# Patient Record
Sex: FEMALE | Race: WHITE | NOT HISPANIC OR LATINO
[De-identification: names, ages, dates, MRNs, and addresses within clinical notes are randomized per-mention and may not be internally consistent; named-entity substitution may affect disease eponyms.]

---

## 2017-08-04 ENCOUNTER — TRANSCRIPTION ENCOUNTER (OUTPATIENT)
Age: 22
End: 2017-08-04

## 2018-03-23 ENCOUNTER — TRANSCRIPTION ENCOUNTER (OUTPATIENT)
Age: 23
End: 2018-03-23

## 2020-11-18 ENCOUNTER — TRANSCRIPTION ENCOUNTER (OUTPATIENT)
Age: 25
End: 2020-11-18

## 2020-12-06 ENCOUNTER — TRANSCRIPTION ENCOUNTER (OUTPATIENT)
Age: 25
End: 2020-12-06

## 2020-12-21 ENCOUNTER — TRANSCRIPTION ENCOUNTER (OUTPATIENT)
Age: 25
End: 2020-12-21

## 2020-12-30 ENCOUNTER — TRANSCRIPTION ENCOUNTER (OUTPATIENT)
Age: 25
End: 2020-12-30

## 2021-02-10 ENCOUNTER — TRANSCRIPTION ENCOUNTER (OUTPATIENT)
Age: 26
End: 2021-02-10

## 2021-03-15 ENCOUNTER — TRANSCRIPTION ENCOUNTER (OUTPATIENT)
Age: 26
End: 2021-03-15

## 2021-04-13 ENCOUNTER — TRANSCRIPTION ENCOUNTER (OUTPATIENT)
Age: 26
End: 2021-04-13

## 2021-08-09 ENCOUNTER — APPOINTMENT (OUTPATIENT)
Dept: OTOLARYNGOLOGY | Facility: CLINIC | Age: 26
End: 2021-08-09
Payer: COMMERCIAL

## 2021-08-09 ENCOUNTER — LABORATORY RESULT (OUTPATIENT)
Age: 26
End: 2021-08-09

## 2021-08-09 VITALS
HEIGHT: 67 IN | TEMPERATURE: 97.9 F | BODY MASS INDEX: 32.49 KG/M2 | WEIGHT: 207 LBS | HEART RATE: 79 BPM | SYSTOLIC BLOOD PRESSURE: 119 MMHG | DIASTOLIC BLOOD PRESSURE: 78 MMHG | OXYGEN SATURATION: 96 %

## 2021-08-09 DIAGNOSIS — Z81.3 FAMILY HISTORY OF OTHER PSYCHOACTIVE SUBSTANCE ABUSE AND DEPENDENCE: ICD-10-CM

## 2021-08-09 DIAGNOSIS — Z78.9 OTHER SPECIFIED HEALTH STATUS: ICD-10-CM

## 2021-08-09 DIAGNOSIS — Z86.59 PERSONAL HISTORY OF OTHER MENTAL AND BEHAVIORAL DISORDERS: ICD-10-CM

## 2021-08-09 DIAGNOSIS — Z87.891 PERSONAL HISTORY OF NICOTINE DEPENDENCE: ICD-10-CM

## 2021-08-09 DIAGNOSIS — Z83.438 FAMILY HISTORY OF OTHER DISORDER OF LIPOPROTEIN METABOLISM AND OTHER LIPIDEMIA: ICD-10-CM

## 2021-08-09 PROBLEM — Z00.00 ENCOUNTER FOR PREVENTIVE HEALTH EXAMINATION: Status: ACTIVE | Noted: 2021-08-09

## 2021-08-09 PROCEDURE — 31575 DIAGNOSTIC LARYNGOSCOPY: CPT

## 2021-08-09 PROCEDURE — 99204 OFFICE O/P NEW MOD 45 MIN: CPT | Mod: 25

## 2021-08-09 NOTE — PHYSICAL EXAM
[Midline] : trachea located in midline position [Laryngoscopy Performed] : laryngoscopy was performed, see procedure section for findings [Normal] : lingual tonsils are normal [de-identified] : +2 on the right and +3 on the left., soft on palpation

## 2021-08-09 NOTE — ASSESSMENT
[FreeTextEntry1] : 25 year old female with low grade and recurrent throat discomfort x 3 months. On exam, she has tonsillar hypertrophy, and Left slightly larger than Right, without discharge or tonsilliths.  She recently had history of strep infection x 1 treated with clindamycin and oral steroids without benefit.  \par \par Today culture and sensitivity was sent.  In the interim, I am recommend rest, hydration, acetaminophen or ibuprofen for pain or discomfort.  We will also start 5 day course of azithromycin (PCN allergic and failed clinda recently), and CT neck with contrast due to asymmetry.  \par \par We will followup with throat culture results and direct abx therapy as such.  Advised her that she does not meet the criteria for tonsillectomy.  If her symptoms do not improve, consider Infectious Disease consult.\par \par - rest, hydration, motrin\par - throat culture, will followup \par - zpak\par - CT neck w Contrast for tonsil asymmetry

## 2021-08-09 NOTE — CONSULT LETTER
[Dear  ___] : Dear  [unfilled], [Consult Letter:] : I had the pleasure of evaluating your patient, [unfilled]. [Please see my note below.] : Please see my note below. [Consult Closing:] : Thank you very much for allowing me to participate in the care of this patient.  If you have any questions, please do not hesitate to contact me. [Sincerely,] : Sincerely, [FreeTextEntry3] : Ion Victoria MD\par Director; [facility]\par Otolaryngology - Head and Neck Surgery\par [facility]\par \par \par Department of Otolaryngology\par [facility]\par \par [address]\par [facility], 10th Floor\[place], [place], [address]\par Office Tel: [phone]\par \par \par

## 2021-08-09 NOTE — PROCEDURE
[de-identified] : Laryngoscopy: Flexible Laryngoscopy - Procedure Note\par \par Pre-operative Diagnosis: tonsil hypertrophy\par Post-operative Diagnosis: tonsil hypertrophy\par Anesthesia: Topical - 1 % Lidocaine/Phenylephrine\par Procedure: Flexible Laryngoscopy\par \par Procedure Details: \par The patient was placed in the sitting position. After decongestant and anesthesia were applied the laryngoscope was passed. The nasal cavities, nasopharynx, oropharynx, hypopharynx, and larynx were all examined. Vocal folds were examined during respiration and phonation. The following findings were noted:\par \par Findings: \par Nose: Septum is midline, turbinates are normal, nasal airways patent, mucosa normal\par Nasopharynx: Adenoids normal, no masses, eustachian tube normal\par Oropharynx: Pharyngeal walls symmetric and without lesion. [place] tonsils 3+, left > right, cryptic, without discharge or tonsilliths. Mild, symmetric lingual tonsil hypertrophy.\par Hypopharynx: Hypopharynx and pyriform sinuses without lesion. No masses or asymmetry. No pooling of secretions.\par Larynx: Epiglottis and aryepiglottic folds were sharp and crisp bilaterally. Bilateral false and true vocal folds normal appearance. Bilateral vocal folds fully mobile and symmetric. Airway was widely patent. \par \par Condition: Stable. Patient tolerated procedure well.\par \par Complications: None\par \par

## 2021-08-09 NOTE — HISTORY OF PRESENT ILLNESS
[de-identified] : 26 yo female c/o persistently enlarged left tonsil, with associated nausea, pain at the end of the day x 3 months.  Denies chills, cough, dysphonia, dysphagia, odynophagia, dyspnea, or recurrent tonsillitis. Reports sore throat at end of day of talking, night sweats.  No fevers, weight loss, referred otalgia.  Non smoker, non drinker.  She works as a [employer], talks to clients all day.\par \par She was referred by Dr. Raines, tested positive for strep, treated with clindamycin and oral steroids 2 weeks ago without relief. Upset stomach, flu-like symptoms with the medications.\par \par No other ENT complaints.\par \par Diet:\par + 3 cups coffee, occasional tea, sometimes spicy/tomato/greasy foods, seltzer, mint, chocolate, social ETOH.\par 2 L of water daily

## 2021-08-14 ENCOUNTER — OUTPATIENT (OUTPATIENT)
Dept: OUTPATIENT SERVICES | Facility: HOSPITAL | Age: 26
LOS: 1 days | End: 2021-08-14
Payer: COMMERCIAL

## 2021-08-14 ENCOUNTER — RESULT REVIEW (OUTPATIENT)
Age: 26
End: 2021-08-14

## 2021-08-14 ENCOUNTER — APPOINTMENT (OUTPATIENT)
Dept: CT IMAGING | Facility: HOSPITAL | Age: 26
End: 2021-08-14

## 2021-08-14 PROCEDURE — 70491 CT SOFT TISSUE NECK W/DYE: CPT | Mod: 26

## 2021-08-14 PROCEDURE — 70491 CT SOFT TISSUE NECK W/DYE: CPT

## 2021-08-16 ENCOUNTER — TRANSCRIPTION ENCOUNTER (OUTPATIENT)
Age: 26
End: 2021-08-16

## 2021-08-16 ENCOUNTER — APPOINTMENT (OUTPATIENT)
Dept: OTOLARYNGOLOGY | Facility: CLINIC | Age: 26
End: 2021-08-16

## 2021-08-17 ENCOUNTER — APPOINTMENT (OUTPATIENT)
Dept: OTOLARYNGOLOGY | Facility: CLINIC | Age: 26
End: 2021-08-17
Payer: COMMERCIAL

## 2021-08-17 PROCEDURE — 99213 OFFICE O/P EST LOW 20 MIN: CPT | Mod: 95

## 2021-08-17 NOTE — ASSESSMENT
[FreeTextEntry1] : 25 year old female with low grade and recurrent throat discomfort x 3 months. On exam, she has tonsillar hypertrophy, and Left slightly larger than Right, without discharge or tonsilliths.  She recently had history of strep infection x 1 treated with clindamycin and oral steroids without benefit.  And now azithromycin without improvement.  CT neck essentially negative.\par \par At the last visit, Throat culture and sensitivity were sent however the lab changed the order and only did a throat culture showing GBS without sensitivities.\par \par At this point I would like her to return to clinic or repeat culture and sensitivities If her symptoms do not improve, consider Infectious Disease consult.\par \par - rest, hydration, Motrin\par - repeat throat culture\par - consider ID consultation, currently with GBS however PCN allergic without improvement on clinda or azithromycin\par - consider dietary and behavioral modification to reduce acid reflux in diet.

## 2021-08-17 NOTE — HISTORY OF PRESENT ILLNESS
[de-identified] : 26 yo female c/o persistently enlarged left tonsil, with associated nausea, pain at the end of the day x 3 months.  Denies chills, cough, dysphonia, dysphagia, odynophagia, dyspnea, or recurrent tonsillitis. Reports sore throat at end of day of talking, night sweats.  No fevers, weight loss, referred otalgia.  Non smoker, non drinker.  She works as a [employer], talks to clients all day.\par \par She was referred by Dr. Raines, tested positive for strep, treated with clindamycin and oral steroids 2 weeks ago without relief. Upset stomach, flu-like symptoms with the medications.\par \par No other ENT complaints.\par \par Diet:\par + 3 cups coffee, occasional tea, sometimes spicy/tomato/greasy foods, seltzer, mint, chocolate, social ETOH.\par 2 L of water daily\par - [FreeTextEntry1] : Update 8/17/21\par Overall stable and well.  She did take the z-kuldeep, but noted no improvement.  She also completed the CT neck and presents to review results as well as of the culture.  No new ENT issues at this time.

## 2021-08-30 ENCOUNTER — LABORATORY RESULT (OUTPATIENT)
Age: 26
End: 2021-08-30

## 2021-08-30 ENCOUNTER — APPOINTMENT (OUTPATIENT)
Dept: OTOLARYNGOLOGY | Facility: CLINIC | Age: 26
End: 2021-08-30
Payer: COMMERCIAL

## 2021-08-30 VITALS
WEIGHT: 205 LBS | HEART RATE: 76 BPM | SYSTOLIC BLOOD PRESSURE: 112 MMHG | TEMPERATURE: 98.2 F | BODY MASS INDEX: 32.95 KG/M2 | HEIGHT: 66 IN | DIASTOLIC BLOOD PRESSURE: 74 MMHG | OXYGEN SATURATION: 97 %

## 2021-08-30 PROCEDURE — 99214 OFFICE O/P EST MOD 30 MIN: CPT

## 2021-08-30 NOTE — PHYSICAL EXAM
[Midline] : trachea located in midline position [Normal] : palatine tonsils are normal [de-identified] : +2 on the right and +3 on the left., soft on palpation, +exudate on the tonsil worse on the right

## 2021-08-30 NOTE — HISTORY OF PRESENT ILLNESS
[de-identified] : 24 yo female c/o persistently enlarged left tonsil, with associated nausea, pain at the end of the day x 3 months. Denies chills, cough, dysphonia, dysphagia, odynophagia, dyspnea, or recurrent tonsillitis. Reports sore throat at end of day of talking, night sweats. No fevers, weight loss, referred otalgia. Non smoker, non drinker. She works as a [employer], talks to clients all day.\par \par She was referred by Dr. Raines, tested positive for strep, treated with clindamycin and oral steroids 2 weeks ago without relief. Upset stomach, flu-like symptoms with the medications.\par \par No other ENT complaints.\par \par Diet:\par + 3 cups coffee, occasional tea, sometimes spicy/tomato/greasy foods, seltzer, mint, chocolate, social ETOH.\par 2 L of water daily\par - \par Update 8/17/21\par Overall stable and well. She did take the z-kuldeep, but noted no improvement. She also completed the CT neck and presents to review results as well as of the culture. No new ENT issues at this time. \par - \par  [FreeTextEntry1] : Update 8/30/21\par Overall stable and well.  She reports her sore throat has worsened and now has persistent pain.  Seen by PCP and noted to have an exudate.  Denies dysphagia, dyspnea, or dysphonia.

## 2021-08-30 NOTE — ASSESSMENT
[FreeTextEntry1] : 25 year old female with low grade and recurrent throat discomfort x 3 months. On exam, she has tonsillar hypertrophy, and Left slightly larger than Right, today with exudates. She recently had history of strep infection x 1 treated with clindamycin and oral steroids without benefit. And now azithromycin without improvement. CT neck essentially negative.\par \par At the last visit, Throat culture and sensitivity were sent however the lab changed the order and only did a throat culture showing GBS without sensitivities.\par \par Today I repeated the throat culture and sensitivities.  In the meantime, I recommend a 10 day course of cefdinir.  She reported a thigh rash with amoxicillin, denies any anaphylaxis or angioedema.  We reviewed this extensively in the literally and she knows to take Benadryl and go to ED if there are any signs of allergic reaction which were reviewed.  I also recommend that she see an Infectious Disease specialist, Dr. Vanessa Padilla for further evaluation.\par \par Ultimately she will need to have a tonsillectomy, but not until 3 mo after she has recovered from this infection.\par \par - cefdinir 300mg bid x 10 days\par - repeat throat culture sent\par - Infectious Disease consult\par - f/u end of this week to review cultures and check progress on abx.

## 2021-09-03 ENCOUNTER — APPOINTMENT (OUTPATIENT)
Dept: OTOLARYNGOLOGY | Facility: CLINIC | Age: 26
End: 2021-09-03
Payer: COMMERCIAL

## 2021-09-03 VITALS
SYSTOLIC BLOOD PRESSURE: 115 MMHG | OXYGEN SATURATION: 97 % | HEART RATE: 73 BPM | DIASTOLIC BLOOD PRESSURE: 78 MMHG | HEIGHT: 66 IN | TEMPERATURE: 98 F | WEIGHT: 205 LBS | BODY MASS INDEX: 32.95 KG/M2

## 2021-09-03 PROCEDURE — 99213 OFFICE O/P EST LOW 20 MIN: CPT

## 2021-09-03 RX ORDER — AZITHROMYCIN 250 MG/1
250 TABLET, FILM COATED ORAL
Qty: 1 | Refills: 0 | Status: DISCONTINUED | COMMUNITY
Start: 2021-08-09 | End: 2021-09-03

## 2021-09-03 RX ORDER — SERTRALINE HYDROCHLORIDE 50 MG/1
50 TABLET, FILM COATED ORAL
Refills: 0 | Status: ACTIVE | COMMUNITY

## 2021-09-03 NOTE — ASSESSMENT
[FreeTextEntry1] : 25 year old female with low grade and recurrent throat discomfort x 3 months. On exam, she has tonsillar hypertrophy, and Left slightly larger than Right, today with exudates. She recently had history of strep infection x 1 treated with clindamycin and oral steroids without benefit. And now azithromycin without improvement. CT neck essentially negative.  At the last visit, Throat culture and sensitivity were sent however the lab changed the order and only did a throat culture showing GBS without sensitivities.\par \par I repeated the throat culture on 8/30 but the lab was unable to process the sample. In the meantime, she reports 20-25% improvement on cefdinir. She is awaiting to hear back from Infectious Disease specialist, Dr. Vanessa Padilla for an appointment for further evaluation.  I repeated the throat culture today.  She will complete the 10 day course of cefdinir and return in 1-2 weeks for re-evaluation.\par \par Ultimately she will need to have a tonsillectomy, but not until 3 mo after she has recovered from this infection.\par \par - complete cefdinir 300mg bid\par - throat culture\par - f/u 1- 2 weeks\par - Infectious Disease consult\par

## 2021-09-03 NOTE — HISTORY OF PRESENT ILLNESS
[de-identified] : 26 yo female c/o persistently enlarged left tonsil, with associated nausea, pain at the end of the day x 3 months. Denies chills, cough, dysphonia, dysphagia, odynophagia, dyspnea, or recurrent tonsillitis. Reports sore throat at end of day of talking, night sweats. No fevers, weight loss, referred otalgia. Non smoker, non drinker. She works as a [employer], talks to clients all day.\par \par She was referred by Dr. Raines, tested positive for strep, treated with clindamycin and oral steroids 2 weeks ago without relief. Upset stomach, flu-like symptoms with the medications.\par \par No other ENT complaints.\par \par Diet:\par + 3 cups coffee, occasional tea, sometimes spicy/tomato/greasy foods, seltzer, mint, chocolate, social ETOH.\par 2 L of water daily\par - \par Update 8/17/21\par Overall stable and well. She did take the z-kuldeep, but noted no improvement. She also completed the CT neck and presents to review results as well as of the culture. No new ENT issues at this time. \par - \par Update 8/30/21\par Overall stable and well. She reports her sore throat has worsened and now has persistent pain. Seen by PCP and noted to have an exudate. Denies dysphagia, dyspnea, or dysphonia. \par - [FreeTextEntry1] : Update 9/3/21\par Overall stable and well.  She has been taking Cefdinir since Tuesday, denies adverse reaction to medication. Sore throat decreased from 8-9/10 to 6/10 and is now off and on instead of constant.  A throat culture was taken on 8/30/21 but the lab was unable to process the sample.

## 2021-09-03 NOTE — PHYSICAL EXAM
[Normal] : orientation to person, place, and time: normal [de-identified] : +2 on the right and +3 on the left., soft on palpation, +exudate on the tonsil worse on the right. \par

## 2021-09-09 LAB — EAR NOSE AND THROAT CULTURE: ABNORMAL

## 2021-09-20 ENCOUNTER — APPOINTMENT (OUTPATIENT)
Dept: INFECTIOUS DISEASE | Facility: CLINIC | Age: 26
End: 2021-09-20

## 2021-09-20 ENCOUNTER — APPOINTMENT (OUTPATIENT)
Dept: OTOLARYNGOLOGY | Facility: CLINIC | Age: 26
End: 2021-09-20
Payer: COMMERCIAL

## 2021-09-20 VITALS — TEMPERATURE: 98 F | SYSTOLIC BLOOD PRESSURE: 112 MMHG | HEART RATE: 73 BPM | DIASTOLIC BLOOD PRESSURE: 75 MMHG

## 2021-09-20 PROCEDURE — 99213 OFFICE O/P EST LOW 20 MIN: CPT

## 2021-09-20 NOTE — HISTORY OF PRESENT ILLNESS
[de-identified] : 24 yo female c/o persistently enlarged left tonsil, with associated nausea, pain at the end of the day x 3 months. Denies chills, cough, dysphonia, dysphagia, odynophagia, dyspnea, or recurrent tonsillitis. Reports sore throat at end of day of talking, night sweats. No fevers, weight loss, referred otalgia. Non smoker, non drinker. She works as a [employer], talks to clients all day.\par \par She was referred by Dr. Raines, tested positive for strep, treated with clindamycin and oral steroids 2 weeks ago without relief. Upset stomach, flu-like symptoms with the medications.\par \par No other ENT complaints.\par \par Diet:\par + 3 cups coffee, occasional tea, sometimes spicy/tomato/greasy foods, seltzer, mint, chocolate, social ETOH.\par 2 L of water daily\par - \par Update 8/17/21\par Overall stable and well. She did take the z-kuldeep, but noted no improvement. She also completed the CT neck and presents to review results as well as of the culture. No new ENT issues at this time. \par - \par Update 8/30/21\par Overall stable and well.  She reports her sore throat has worsened and now has persistent pain.  Seen by PCP and noted to have an exudate.  Denies dysphagia, dyspnea, or dysphonia.\par - [FreeTextEntry1] : Update 9/20/21\par Pt is doing much better. Essentially back to her baseline and asymptomatic.  No new ENT issues

## 2021-09-20 NOTE — ASSESSMENT
[FreeTextEntry1] : 25 year old female with low grade and recurrent throat discomfort.  Symptoms have now abated after using cefdinir.  She is essentially back to her baseline.  At this point I would wait at least 3 months and than plan for adenotonsillectomy.  Risk, benefits and alternatives of adenotonsillectomy were discussed including bleeding, infection, pain, risk of anesthesia, problems chewing or swallowing, referred ear pain, need for further procedures and possible time off of work.  Should would like to proceed.  She knows that if she has another infection will with post this date.\par \par In the interim, i would recommend consultation with ID for their thoughts on treating this infection, or should symptoms recur.  Likewise, I think she would benefit from testing to see if PCN allergy is real.  Followup next for pre op, or sooner should symptoms return.\par \par \par - plan for adenotonsillectomy\par - fu if symptoms recur\par - Infectious Disease consult\par - PCN allergy testing

## 2021-09-20 NOTE — PHYSICAL EXAM
[Midline] : trachea located in midline position [Normal] : orientation to person, place, and time: normal [de-identified] : +2 bilaterally with some tonsillith but no exudate.

## 2021-10-11 ENCOUNTER — APPOINTMENT (OUTPATIENT)
Dept: INFECTIOUS DISEASE | Facility: CLINIC | Age: 26
End: 2021-10-11
Payer: COMMERCIAL

## 2021-10-11 VITALS
HEART RATE: 81 BPM | WEIGHT: 207 LBS | HEIGHT: 66 IN | DIASTOLIC BLOOD PRESSURE: 73 MMHG | OXYGEN SATURATION: 97 % | TEMPERATURE: 97.6 F | SYSTOLIC BLOOD PRESSURE: 105 MMHG | BODY MASS INDEX: 33.27 KG/M2

## 2021-10-11 PROCEDURE — 99203 OFFICE O/P NEW LOW 30 MIN: CPT

## 2021-10-11 RX ORDER — CEFDINIR 300 MG/1
300 CAPSULE ORAL
Qty: 20 | Refills: 0 | Status: COMPLETED | COMMUNITY
Start: 2021-08-30 | End: 2021-10-11

## 2021-10-11 RX ORDER — ERGOCALCIFEROL 1.25 MG/1
1.25 MG CAPSULE, LIQUID FILLED ORAL
Qty: 4 | Refills: 0 | Status: ACTIVE | COMMUNITY
Start: 2021-03-25

## 2021-10-11 RX ORDER — PREDNISONE 5 MG/1
5 TABLET ORAL
Qty: 20 | Refills: 0 | Status: DISCONTINUED | COMMUNITY
Start: 2021-07-22

## 2021-10-11 RX ORDER — CLINDAMYCIN HYDROCHLORIDE 150 MG/1
150 CAPSULE ORAL
Qty: 40 | Refills: 0 | Status: DISCONTINUED | COMMUNITY
Start: 2021-07-16

## 2021-10-11 NOTE — HISTORY OF PRESENT ILLNESS
[FreeTextEntry1] : 26 yo female presenting for ID evaluation of pharyngitis. She reports episode of streptococcal pharyngitis in childhood. She subsequently developed L tonsillar enlargement/tonsillitis since ~4/2021. She has hx amoxicillin allergy (hives as a child) and has not been re-exposed to penicillins. She was treated with clindamycin (reported adverse effects), azithromycin (no improvement), and most recently a course of cefdinir in early Sept 2021 7-10d with interval resolution of tonsillitis. She continues to note L tonsillar enlargement and occasional halitosis but no recurrence of throat pain or exudate. She denies fever. Does note intermittent night sweats/diaphoresis (preceded onset of throat symptoms) ?related to SSRI. Pending L palatine tonsillectomy 1/2022.

## 2021-10-11 NOTE — ASSESSMENT
[FreeTextEntry1] : 24 yo female presenting for ID evaluation of pharyngitis. No current evidence of tonsillitis/pharyngitis. Pending L palatine tonsillectomy 1/2022.\par - referral to allergy for PCN skin testing provided\par - pending allergist assessment, if develops recurrent tonsillitis could consider empiric treatment with cefdinir again given good response; alternative in PCN allergic patient could be levofloxacin plus metronidazole (although may be less well-tolerated); would avoid future clindamycin use given high rates of resistance in GBS and oral anaerobes. \par - if PCN skin testing negative, then PO amoxicillin would be a future option for treatment.\par \par rtc prn

## 2021-10-11 NOTE — PHYSICAL EXAM
[General Appearance - Alert] : alert [General Appearance - In No Acute Distress] : in no acute distress [FreeTextEntry1] : L palatine tonsil enlargement; tonsillith; no exudates [Skin Color & Pigmentation] : normal skin color and pigmentation [] : no rash [Deep Tendon Reflexes (DTR)] : deep tendon reflexes were 2+ and symmetric [Sensation] : the sensory exam was normal to light touch and pinprick [No Focal Deficits] : no focal deficits [Oriented To Time, Place, And Person] : oriented to person, place, and time [Affect] : the affect was normal

## 2021-11-20 ENCOUNTER — TRANSCRIPTION ENCOUNTER (OUTPATIENT)
Age: 26
End: 2021-11-20

## 2021-12-20 ENCOUNTER — APPOINTMENT (OUTPATIENT)
Dept: OTOLARYNGOLOGY | Facility: CLINIC | Age: 26
End: 2021-12-20
Payer: COMMERCIAL

## 2021-12-20 VITALS
TEMPERATURE: 98.4 F | BODY MASS INDEX: 33.75 KG/M2 | DIASTOLIC BLOOD PRESSURE: 79 MMHG | HEIGHT: 66 IN | WEIGHT: 210 LBS | OXYGEN SATURATION: 97 % | SYSTOLIC BLOOD PRESSURE: 120 MMHG | HEART RATE: 79 BPM

## 2021-12-20 DIAGNOSIS — Z88.0 ALLERGY STATUS TO PENICILLIN: ICD-10-CM

## 2021-12-20 PROCEDURE — 99214 OFFICE O/P EST MOD 30 MIN: CPT

## 2021-12-20 NOTE — PHYSICAL EXAM
[Midline] : trachea located in midline position [Normal] : orientation to person, place, and time: normal [de-identified] : +2 bilaterally with some tonsillith but no exudate.

## 2021-12-20 NOTE — HISTORY OF PRESENT ILLNESS
[de-identified] : 24 yo female c/o persistently enlarged left tonsil, with associated nausea, pain at the end of the day x 3 months. Denies chills, cough, dysphonia, dysphagia, odynophagia, dyspnea, or recurrent tonsillitis. Reports sore throat at end of day of talking, night sweats. No fevers, weight loss, referred otalgia. Non smoker, non drinker. She works as a [employer], talks to clients all day.\par \par She was referred by Dr. Raines, tested positive for strep, treated with clindamycin and oral steroids 2 weeks ago without relief. Upset stomach, flu-like symptoms with the medications.\par \par No other ENT complaints.\par \par Diet:\par + 3 cups coffee, occasional tea, sometimes spicy/tomato/greasy foods, seltzer, mint, chocolate, social ETOH.\par 2 L of water daily\par - \par Update 8/17/21\par Overall stable and well. She did take the z-kuldeep, but noted no improvement. She also completed the CT neck and presents to review results as well as of the culture. No new ENT issues at this time. \par - \par Update 8/30/21\par Overall stable and well.  She reports her sore throat has worsened and now has persistent pain.  Seen by PCP and noted to have an exudate.  Denies dysphagia, dyspnea, or dysphonia.\par -\par Update 9/20/21\par Pt is doing much better. Essentially back to her baseline and asymptomatic.  No new ENT issues\par - [FreeTextEntry1] : Update 12/20/21\par Pt is overall stable and well.  No change in symptoms.  No new ENT symptoms.  Seen by ID and recommending c/s with allergy to test PCN allergy.  Also recommended continued use with cephalosporins as needed.

## 2021-12-20 NOTE — ASSESSMENT
[FreeTextEntry1] : 26 year old female with low grade and recurrent throat discomfort.  Previous tonsillitis abated after using cefdinir.  She is back to her baseline and has waited at least 3 months for adenotonsillectomy.  Surgery is planned for next month.  Risk, benefits and alternatives of adenotonsillectomy were again discussed including bleeding, infection, pain, risk of anesthesia, problems chewing or swallowing, referred ear pain, need for further procedures and possible time off of work.  Should would like to proceed.  She knows that if she has another infection will with postpone this date.\par \par Of note consultation with ID rec ok use of cephalosporins if needed and rec c/s with allergy to see if PCN allergy is real.\par \par \par - plan for adenotonsillectomy\par - PCN allergy testing

## 2022-01-03 ENCOUNTER — LABORATORY RESULT (OUTPATIENT)
Age: 27
End: 2022-01-03

## 2022-01-04 ENCOUNTER — TRANSCRIPTION ENCOUNTER (OUTPATIENT)
Age: 27
End: 2022-01-04

## 2022-01-05 ENCOUNTER — RESULT REVIEW (OUTPATIENT)
Age: 27
End: 2022-01-05

## 2022-01-05 ENCOUNTER — APPOINTMENT (OUTPATIENT)
Dept: OTOLARYNGOLOGY | Facility: AMBULATORY SURGERY CENTER | Age: 27
End: 2022-01-05

## 2022-01-05 ENCOUNTER — OUTPATIENT (OUTPATIENT)
Dept: OUTPATIENT SERVICES | Facility: HOSPITAL | Age: 27
LOS: 1 days | Discharge: HOME | End: 2022-01-05
Payer: COMMERCIAL

## 2022-01-05 PROCEDURE — 42821 REMOVE TONSILS AND ADENOIDS: CPT

## 2022-01-05 PROCEDURE — 88304 TISSUE EXAM BY PATHOLOGIST: CPT | Mod: 26

## 2022-01-05 RX ORDER — HYDROCODONE BITARTRATE AND ACETAMINOPHEN 5; 325 MG/1; MG/1
5-325 TABLET ORAL
Qty: 35 | Refills: 0 | Status: ACTIVE | COMMUNITY
Start: 2022-01-05 | End: 1900-01-01

## 2022-01-05 RX ORDER — METHYLPREDNISOLONE 4 MG/1
4 TABLET ORAL
Qty: 1 | Refills: 0 | Status: ACTIVE | COMMUNITY
Start: 2022-01-05 | End: 1900-01-01

## 2022-01-10 LAB — SURGICAL PATHOLOGY STUDY: SIGNIFICANT CHANGE UP

## 2022-01-11 RX ORDER — HYDROCODONE BITARTRATE AND ACETAMINOPHEN 5; 325 MG/1; MG/1
5-325 TABLET ORAL
Qty: 20 | Refills: 0 | Status: ACTIVE | COMMUNITY
Start: 2022-01-11 | End: 1900-01-01

## 2022-01-14 ENCOUNTER — APPOINTMENT (OUTPATIENT)
Dept: OTOLARYNGOLOGY | Facility: CLINIC | Age: 27
End: 2022-01-14
Payer: COMMERCIAL

## 2022-01-14 VITALS
OXYGEN SATURATION: 96 % | DIASTOLIC BLOOD PRESSURE: 65 MMHG | TEMPERATURE: 98.7 F | HEIGHT: 66 IN | HEART RATE: 85 BPM | BODY MASS INDEX: 33.75 KG/M2 | SYSTOLIC BLOOD PRESSURE: 107 MMHG | WEIGHT: 210 LBS

## 2022-01-14 DIAGNOSIS — R07.0 PAIN IN THROAT: ICD-10-CM

## 2022-01-14 DIAGNOSIS — J35.8 OTHER CHRONIC DISEASES OF TONSILS AND ADENOIDS: ICD-10-CM

## 2022-01-14 DIAGNOSIS — J35.1 HYPERTROPHY OF TONSILS: ICD-10-CM

## 2022-01-14 DIAGNOSIS — J03.00 ACUTE STREPTOCOCCAL TONSILLITIS, UNSPECIFIED: ICD-10-CM

## 2022-01-14 PROCEDURE — 99024 POSTOP FOLLOW-UP VISIT: CPT

## 2022-01-14 NOTE — HISTORY OF PRESENT ILLNESS
[de-identified] : 24 yo female c/o persistently enlarged left tonsil, with associated nausea, pain at the end of the day x 3 months. Denies chills, cough, dysphonia, dysphagia, odynophagia, dyspnea, or recurrent tonsillitis. Reports sore throat at end of day of talking, night sweats. No fevers, weight loss, referred otalgia. Non smoker, non drinker. She works as a hairstylist, talks to clients all day.\par \par She was referred by Dr. Raines, tested positive for strep, treated with clindamycin and oral steroids 2 weeks ago without relief. Upset stomach, flu-like symptoms with the medications.\par \par No other ENT complaints.\par \par Diet:\par + 3 cups coffee, occasional tea, sometimes spicy/tomato/greasy foods, seltzer, mint, chocolate, social ETOH.\par 2 L of water daily\par - \par Update 8/17/21\par Overall stable and well. She did take the z-kuldeep, but noted no improvement. She also completed the CT neck and presents to review results as well as of the culture. No new ENT issues at this time. \par - \par Update 8/30/21\par Overall stable and well.  She reports her sore throat has worsened and now has persistent pain.  Seen by PCP and noted to have an exudate.  Denies dysphagia, dyspnea, or dysphonia.\par -\par Update 9/20/21\par Pt is doing much better. Essentially back to her baseline and asymptomatic.  No new ENT issues\par -\par Update 12/20/21\par Pt is overall stable and well.  No change in symptoms.  No new ENT symptoms.  Seen by ID and recommending c/s with allergy to test PCN allergy.  Also recommended continued use with cephalosporins as needed.\par - [FreeTextEntry1] : Update 1/14/22\par POD#9, overall stable and well.  Still with some mild discomfort but improving.  Back to regular diet.  No concern for bleeding or breathing issues.  No new ENT issues at this time.

## 2022-01-14 NOTE — ASSESSMENT
[FreeTextEntry1] : 26 year old female with low grade and recurrent throat discomfort.  Previous tonsillitis abated after using cefdinir.  She is back to her baseline and has waited at least 3 months for adenotonsillectomy.  \par \par Pt is now POD#9 s/p adenotonsillectomy.  Overall stable and well. On exam she is healing well.  Pathology is benign.  At this point follow up with me as needed.\par \par - fu prn

## 2022-01-14 NOTE — PHYSICAL EXAM
[Normal] : lingual tonsils are normal [Midline] : trachea located in midline position [Removed] : palatine tonsils previously removed [de-identified] : well healing eschar bilateral without evidence of bleeding

## 2024-08-23 ENCOUNTER — APPOINTMENT (OUTPATIENT)
Dept: ENDOCRINOLOGY | Facility: CLINIC | Age: 29
End: 2024-08-23

## (undated) DEVICE — PETRI DISH MED 3.5"

## (undated) DEVICE — VENODYNE/SCD SLEEVE CALF MEDIUM

## (undated) DEVICE — NDL HYPO SAFE 25G X 5/8" (ORANGE)

## (undated) DEVICE — STAPLER SKIN VISI-STAT 35 WIDE

## (undated) DEVICE — SOL ANTI FOG

## (undated) DEVICE — PACK TONSIL ADENOID

## (undated) DEVICE — ELCTR COAGULATOR HANDSWITCHING 10FR

## (undated) DEVICE — WARMING BLANKET LOWER ADULT

## (undated) DEVICE — SYR LUER LOK 10CC

## (undated) DEVICE — TUBING SUCTION NONCONDUCTIVE 6MM X 12FT

## (undated) DEVICE — ELCTR BOVIE TIP BLADE INSULATED 2.8" EDGE WITH SAFETY

## (undated) DEVICE — GLV 7.5 PROTEXIS (WHITE)

## (undated) DEVICE — NDL HYPO REGULAR BEVEL 25G X 1.5" (BLUE)